# Patient Record
Sex: FEMALE | URBAN - METROPOLITAN AREA
[De-identification: names, ages, dates, MRNs, and addresses within clinical notes are randomized per-mention and may not be internally consistent; named-entity substitution may affect disease eponyms.]

---

## 2018-04-23 ENCOUNTER — OFFICE VISIT (OUTPATIENT)
Dept: OBGYN | Facility: CLINIC | Age: 38
End: 2018-04-23
Attending: OBSTETRICS & GYNECOLOGY
Payer: COMMERCIAL

## 2018-04-23 DIAGNOSIS — N92.0 MENORRHAGIA WITH REGULAR CYCLE: Primary | ICD-10-CM

## 2018-04-23 LAB
DEPRECATED CALCIDIOL+CALCIFEROL SERPL-MC: 24 UG/L (ref 20–75)
ERYTHROCYTE [DISTWIDTH] IN BLOOD BY AUTOMATED COUNT: 12.4 % (ref 10–15)
HCT VFR BLD AUTO: 42.1 % (ref 35–47)
HGB BLD-MCNC: 13.9 G/DL (ref 11.7–15.7)
MCH RBC QN AUTO: 32.4 PG (ref 26.5–33)
MCHC RBC AUTO-ENTMCNC: 33 G/DL (ref 31.5–36.5)
MCV RBC AUTO: 98 FL (ref 78–100)
PLATELET # BLD AUTO: 214 10E9/L (ref 150–450)
RBC # BLD AUTO: 4.29 10E12/L (ref 3.8–5.2)
TSH SERPL DL<=0.005 MIU/L-ACNC: 1.62 MU/L (ref 0.4–4)
WBC # BLD AUTO: 5.4 10E9/L (ref 4–11)

## 2018-04-23 PROCEDURE — 82306 VITAMIN D 25 HYDROXY: CPT | Performed by: OBSTETRICS & GYNECOLOGY

## 2018-04-23 PROCEDURE — 36415 COLL VENOUS BLD VENIPUNCTURE: CPT | Performed by: OBSTETRICS & GYNECOLOGY

## 2018-04-23 PROCEDURE — 85027 COMPLETE CBC AUTOMATED: CPT | Performed by: OBSTETRICS & GYNECOLOGY

## 2018-04-23 PROCEDURE — 84443 ASSAY THYROID STIM HORMONE: CPT | Performed by: OBSTETRICS & GYNECOLOGY

## 2018-04-23 PROCEDURE — G0463 HOSPITAL OUTPT CLINIC VISIT: HCPCS | Mod: ZF

## 2018-04-23 ASSESSMENT — ANXIETY QUESTIONNAIRES
2. NOT BEING ABLE TO STOP OR CONTROL WORRYING: NEARLY EVERY DAY
3. WORRYING TOO MUCH ABOUT DIFFERENT THINGS: NEARLY EVERY DAY
GAD7 TOTAL SCORE: 20
6. BECOMING EASILY ANNOYED OR IRRITABLE: NEARLY EVERY DAY
5. BEING SO RESTLESS THAT IT IS HARD TO SIT STILL: MORE THAN HALF THE DAYS
7. FEELING AFRAID AS IF SOMETHING AWFUL MIGHT HAPPEN: NEARLY EVERY DAY
1. FEELING NERVOUS, ANXIOUS, OR ON EDGE: NEARLY EVERY DAY

## 2018-04-23 ASSESSMENT — PATIENT HEALTH QUESTIONNAIRE - PHQ9: 5. POOR APPETITE OR OVEREATING: NEARLY EVERY DAY

## 2018-04-23 NOTE — LETTER
Date:April 25, 2018      Patient was self referred, no letter generated. Do not send.        [facility] Health Information

## 2018-04-23 NOTE — NURSING NOTE
Chief Complaint   Patient presents with     Establish Care     Discuss menstural cycle and PMDD. Has hx of depression and anxiety.       See GALLO David 4/23/2018

## 2018-04-23 NOTE — PROGRESS NOTES
CC/HPI:   Kaycee Martínez is a 37 year old  with c/o heavy periods and worsening mood/anxiety symptoms the 2 weeks before her period.  She was referred to me by her mother who is a patient of mine.  Her periods continue to be very regular but have been getting progressively heavier over the last year.  She uses condoms for contraception.  She thinks that she is done having children but hasn't completely ruled out one more so is not interested in permanent sterilization.  Her pap is current from an outside clinic and she has no history of abnormal paps.  She has never had a pelvic ultrasound or endometrial biopsy.        HISTORIES:  Patient Active Problem List   Diagnosis   (none) - all problems resolved or deleted     Past Medical History:   Diagnosis Date     Anxiety      Depression      Varicose vein of leg     on right leg     Past Surgical History:   Procedure Laterality Date     KNEE SURGERY Bilateral     ACL repair     No current outpatient prescriptions on file.     Allergies   Allergen Reactions     Liquid Adhesive Itching and Rash     Adhesive pad gels used for EKG monitors has caused rash and itching which spreads if scratched.  Adhesive pad gels used for EKG monitors has caused rash and itching which spreads if scratched.     Social History     Social History     Marital status:      Spouse name: N/A     Number of children: N/A     Years of education: N/A     Occupational History     Merchandising supervisor      Social History Main Topics     Smoking status: Never Smoker     Smokeless tobacco: Never Used     Alcohol use Yes      Comment: 1 drink twice a month     Drug use: No     Sexual activity: Yes     Partners: Male     Birth control/ protection: Condom     Other Topics Concern     Not on file     Social History Narrative     No narrative on file     Family History   Problem Relation Age of Onset     CEREBROVASCULAR DISEASE Father      Mini stroke     Breast Cancer Maternal Grandmother  60          Gyn Hx:   No LMP recorded.  Menses:   See HPI    Review Of Systems:  CONSTITUTIONAL: NEGATIVE for fever, chills  EYES: NEGATIVE for vision changes   RESP: NEGATIVE for significant cough or SOB  CV: NEGATIVE for chest pain, palpitations   GI: NEGATIVE for nausea, abdominal pain, heartburn, or change in bowel habits  : NEGATIVE for frequency, dysuria, or hematuria  MUSCULOSKELETAL: NEGATIVE for significant arthralgias or myalgia  NEURO: NEGATIVE for weakness, dizziness or paresthesias or headache    EXAM:  There were no vitals taken for this visit.  There is no height or weight on file to calculate BMI.    General - pleasant female in no acute distress.    ASSESSMENT    36 y/o  with heavy periods and increasing luteal phase mood symptoms.    Plan    Will get a pelvic u/s for further assessment of the endometrial stripe, TSH, CBC and vitamin D today.  Briefly discussed options for cycle control-OCPs, Mirena IUD and possibility of adding a luteal phase vs a daily SSRI for mood symptoms.  Discussed starting therapy here with Dr. Ovalle or Dr. Morrell as well.  She was given information about the Mirena IUD and cards for Lore Ovlale and Porsche.      Bre Forbes MD, FACOG

## 2018-04-23 NOTE — MR AVS SNAPSHOT
After Visit Summary   4/23/2018    Kaycee Martínez    MRN: [id number]           Patient Information     Date Of Birth          1980        Visit Information        Provider Department      4/23/2018 11:00 AM Bre Forbes MD [facility]        Today's Diagnoses     Menorrhagia with regular cycle    -  1       Follow-ups after your visit        Follow-up notes from your care team     Return in about 2 years (around 4/23/2020).      Your next 10 appointments already scheduled     May 07, 2018  1:30 PM CDT   ULTRASOUND with [facility] ULTRASOUND   [facility] ([facility])    [facility] [address]  [address]  [facility] [id number] [phone]            May 07, 2018  2:00 PM CDT   Return Visit with Bre Forbes MD   [facility] ([facility])    [facility] [address]  [address]  [address]  [facility] [id number] [phone]              Future tests that were ordered for you today     Open Future Orders        Priority Expected Expires Ordered    US GYN Complete Transvaginal - 27449 (In Clinic) Routine  8/21/2018 4/23/2018            Who to contact     Please call your clinic at [phone] to:    Ask questions about your health    Make or cancel appointments    Discuss your medicines    Learn about your test results    Speak to your doctor            Additional Information About Your Visit        MyChart Information     [employer] is an electronic gateway that provides easy, online access to your medical records. With [employer], you can request a clinic appointment, read your test results, renew a prescription or communicate with your care team.     To sign up for [employer] visit the website at [email or web address]   You will be asked to enter the access code listed below, as well as some personal information. Please follow the  directions to create your username and password.     Your access code is: [email or web address]  Expires: 2018  6:31 AM     Your access code will  in 90 days. If you need help or a new code, please contact your [facility] or call [phone] for assistance.        Care EveryWhere ID     This is your Care EveryWhere ID. This could be used by other organizations to access your [place] medical records  [id number]         Blood Pressure from Last 3 Encounters:   No data found for BP    Weight from Last 3 Encounters:   No data found for Wt              We Performed the Following     25- OH-Vitamin D     CBC with Platelets     TSH with free T4 reflex        Primary Care Provider    None Specified       No primary provider on file.        Equal Access to Services     FLORENCIA MCCLURE : Ana Paz, ibeth laguna, ish tolbert, blanche chamorro . So [facility] [phone].    ATENCIÓN: Si habla español, tiene a chang disposición servicios gratuitos de asistencia lingüística. Llame al [phone].    We comply with applicable federal civil rights laws and [place] laws. We do not discriminate on the basis of race, color, national origin, age, disability, sex, sexual orientation, or gender identity.            Thank you!     Thank you for choosing [facility]  for your care. Our goal is always to provide you with excellent care. Hearing back from our patients is one way we can continue to improve our services. Please take a few minutes to complete the written survey that you may receive in the mail after your visit with us. Thank you!             Your Updated Medication List - Protect others around you: Learn how to safely use, store and throw away your medicines at www.disposemymeds.org.      Notice  As of 2018 11:59 PM    You have not been prescribed any medications.

## 2018-04-23 NOTE — LETTER
4/24/2018         Kaycee Martínez   [address] [facility] [address]        Dear Ms. Martínez:    The results of your recent labs are all normal.      Results for orders placed or performed in visit on 04/23/18   TSH with free T4 reflex   Result Value Ref Range    TSH 1.62 0.40 - 4.00 mU/L   CBC with Platelets   Result Value Ref Range    WBC 5.4 4.0 - 11.0 10e9/L    RBC Count 4.29 3.8 - 5.2 10e12/L    Hemoglobin 13.9 11.7 - 15.7 g/dL    Hematocrit 42.1 35.0 - 47.0 %    MCV 98 78 - 100 fl    MCH 32.4 26.5 - 33.0 pg    MCHC 33.0 31.5 - 36.5 g/dL    RDW 12.4 10.0 - 15.0 %    Platelet Count 214 150 - 450 10e9/L   25- OH-Vitamin D   Result Value Ref Range    Vitamin D Deficiency screening 24 20 - 75 ug/L         Please note that test explanations are brief and do not reflect all diagnostic uses.  If you have any questions or concerns, please call the clinic at [phone].      Sincerely,      Bre Forbes MD

## 2018-04-23 NOTE — LETTER
4/23/2018       RE: Kaycee Martínez  [address]  [facility] [address]     Dear Colleague,    Thank you for referring your patient, Kaycee Martínez, to the [facility] at [facility]. Please see a copy of my visit note below.    CC/HPI:   Kaycee Martínez is a 37 year old  with c/o heavy periods and worsening mood/anxiety symptoms the 2 weeks before her period.  She was referred to me by her mother who is a patient of mine.  Her periods continue to be very regular but have been getting progressively heavier over the last year.  She uses condoms for contraception.  She thinks that she is done having children but hasn't completely ruled out one more so is not interested in permanent sterilization.  Her pap is current from an outside clinic and she has no history of abnormal paps.  She has never had a pelvic ultrasound or endometrial biopsy.        HISTORIES:  Patient Active Problem List   Diagnosis   (none) - all problems resolved or deleted     Past Medical History:   Diagnosis Date     Anxiety      Depression      Varicose vein of leg     on right leg     Past Surgical History:   Procedure Laterality Date     KNEE SURGERY Bilateral     ACL repair     No current outpatient prescriptions on file.     Allergies   Allergen Reactions     Liquid Adhesive Itching and Rash     Adhesive pad gels used for EKG monitors has caused rash and itching which spreads if scratched.  Adhesive pad gels used for EKG monitors has caused rash and itching which spreads if scratched.     Social History     Social History     Marital status:      Spouse name: N/A     Number of children: N/A     Years of education: N/A     Occupational History     Merchandising supervisor      Social History Main Topics     Smoking status: Never Smoker     Smokeless tobacco: Never Used     Alcohol use Yes      Comment: 1 drink twice a month     Drug use: No     Sexual activity:  Yes     Partners: Male     Birth control/ protection: Condom     Other Topics Concern     Not on file     Social History Narrative     No narrative on file     Family History   Problem Relation Age of Onset     CEREBROVASCULAR DISEASE Father      Mini stroke     Breast Cancer Maternal Grandmother 60          Gyn Hx:   No LMP recorded.  Menses:   See HPI    Review Of Systems:  CONSTITUTIONAL: NEGATIVE for fever, chills  EYES: NEGATIVE for vision changes   RESP: NEGATIVE for significant cough or SOB  CV: NEGATIVE for chest pain, palpitations   GI: NEGATIVE for nausea, abdominal pain, heartburn, or change in bowel habits  : NEGATIVE for frequency, dysuria, or hematuria  MUSCULOSKELETAL: NEGATIVE for significant arthralgias or myalgia  NEURO: NEGATIVE for weakness, dizziness or paresthesias or headache    EXAM:  There were no vitals taken for this visit.  There is no height or weight on file to calculate BMI.    General - pleasant female in no acute distress.    ASSESSMENT    38 y/o  with heavy periods and increasing luteal phase mood symptoms.    Plan    Will get a pelvic u/s for further assessment of the endometrial stripe, TSH, CBC and vitamin D today.  Briefly discussed options for cycle control-OCPs, Mirena IUD and possibility of adding a luteal phase vs a daily SSRI for mood symptoms.  Discussed starting therapy here with Dr. Ovalle or Dr. Morrell as well.  She was given information about the Mirena IUD and cards for Lore Ovalle and Porsche.      Bre Forbes MD, FACOG      Again, thank you for allowing me to participate in the care of your patient.      Sincerely,    Bre Forbes MD

## 2018-04-24 ASSESSMENT — PATIENT HEALTH QUESTIONNAIRE - PHQ9: SUM OF ALL RESPONSES TO PHQ QUESTIONS 1-9: 16

## 2018-04-24 ASSESSMENT — ANXIETY QUESTIONNAIRES: GAD7 TOTAL SCORE: 20

## 2018-04-30 ENCOUNTER — HEALTH MAINTENANCE LETTER (OUTPATIENT)
Age: 38
End: 2018-04-30

## 2018-05-07 ENCOUNTER — OFFICE VISIT (OUTPATIENT)
Dept: OBGYN | Facility: CLINIC | Age: 38
End: 2018-05-07
Attending: OBSTETRICS & GYNECOLOGY
Payer: COMMERCIAL

## 2018-05-07 VITALS
BODY MASS INDEX: 29.02 KG/M2 | HEART RATE: 62 BPM | DIASTOLIC BLOOD PRESSURE: 67 MMHG | HEIGHT: 64 IN | WEIGHT: 170 LBS | SYSTOLIC BLOOD PRESSURE: 105 MMHG

## 2018-05-07 DIAGNOSIS — N92.0 MENORRHAGIA WITH REGULAR CYCLE: ICD-10-CM

## 2018-05-07 DIAGNOSIS — N92.0 MENORRHAGIA WITH REGULAR CYCLE: Primary | ICD-10-CM

## 2018-05-07 DIAGNOSIS — F43.22 ADJUSTMENT DISORDER WITH ANXIOUS MOOD: ICD-10-CM

## 2018-05-07 PROCEDURE — G0463 HOSPITAL OUTPT CLINIC VISIT: HCPCS | Mod: ZF,25

## 2018-05-07 PROCEDURE — 76830 TRANSVAGINAL US NON-OB: CPT | Mod: ZF

## 2018-05-07 NOTE — LETTER
Date:May 9, 2018      Patient was self referred, no letter generated. Do not send.        [facility] Health Information

## 2018-05-07 NOTE — LETTER
Date:May 9, 2018      Patient was self referred, no letter generated. Do not send.        [facility] Health Information

## 2018-05-07 NOTE — LETTER
"5/7/2018       RE: Kaycee Martínez  [address]  [facility] [address]     Dear Colleague,    Thank you for referring your patient, Kaycee Martínez, to the [facility] at [facility]. Please see a copy of my visit note below.    S:  Pt is a 38 y/o  who returns today to follow up her ultrasound that was done earlier today and well as to go over options for her heavy periods and premenstrual symptoms.  Today she states that she would really like to start something for her mood and anxiety symptoms and thinks that she would like to have a Mirena IUD placed for cycle control.  She is near mid-cycle today.    O: /67 (BP Location: Left arm, Patient Position: Chair)  Pulse 62  Ht 1.626 m (5' 4\")  Wt 77.1 kg (170 lb)  LMP 04/16/2018  Breastfeeding? No  BMI 29.18 kg/m2    gen-pleasant, NAD    37 year old female with LMP approximately 2 - 3 weeks ago, presents for gynecologic ultrasound indicated by menorrhagia.  This study was done transvaginally.     Uterine findings:                        Presence: Visible Size: Normal 5.8x7.0x5.0 cm.  Endometrium = 11.4 mm.                        Cx length = 32.4 mm.                            Flexion:  Retroverted            Position: Midline                    Margins: Smooth                   Shape: Normal                        Contour: Regular                  Texture: Homogeneous                    Cavity: Normal                      Masses: Normal     Pelvic findings:                         Right Adnexa: Normal                        Left Adnexa: Normal                        Bladder:  Normal                                                                                                             Cul - de - sac fluid: None     Ovarian follicles:                        Right ovary:  4.2x2.6x2.3cm.                           Small follicles                           Size(s):  Less than 5mm         "                   Left ovary:  3.0x2.2x1.5cm.                           Small follicles                           Size(s):  Less than 5mm        Comments:  Normal appearing uterus and endometrial stripe.  Normal ovaries bilaterally.  Further studies as clinically indicated.     Evonne PearlMS Bre Forbes MD, FACOG    A:  38 y/o  with heavy periods and increased mood/anxiety symptoms.  Her pelvic ultrasound from today is normal.    P:  Discussed starting Zoloft for her mood, she would like to do this.  Will start with 50 mg daily, titrate start with 25 mg daily for 3 days.  She will call with the first day of her next period to schedule Mirena IUD insertion.  Reviewed the expected spotting after placement that can last up to 3 months.  Likely her next visit will be about 2 weeksfrom now-can follow up on her Zoloft then and increase the dose if needed.    Bre Forbes MD, FACOG        Again, thank you for allowing me to participate in the care of your patient.      Sincerely,    Bre Forbes MD

## 2018-05-07 NOTE — MR AVS SNAPSHOT
After Visit Summary   5/7/2018    Kaycee Martínez    MRN: [id number]           Patient Information     Date Of Birth          1980        Visit Information        Provider Department      5/7/2018 1:30 PM [facility] ULTRASOUND [facility]        Today's Diagnoses     Menorrhagia with regular cycle           Follow-ups after your visit        Your next 10 appointments already scheduled     May 23, 2018  2:00 PM CDT   Return Visit with Ghada Ovalle, PhD LUIS ANTONIO   [facility]  ([facility])    [facility]  [address]  [address]  [facility] [id number] [phone]            Jul 16, 2018  3:00 PM CDT   Return Visit with Ghada Ovalle, PhD LUIS ANTONIO   [facility]  ([facility])    [facility]  [address]  [address]  [facility] [id number] [phone]            Aug 20, 2018  2:00 PM CDT   Return Visit with Ghdaa Ovalle, PhD    [facility]  ([facility])    [facility]  [address]  [address]  [facility] [id number] [phone]              Who to contact     Please call your clinic at [phone] to:    Ask questions about your health    Make or cancel appointments    Discuss your medicines    Learn about your test results    Speak to your doctor            Additional Information About Your Visit        [employer] Information     [employer] gives you secure access to your electronic health record. If you see a primary care provider, you can also send messages to your care team and make appointments. If you have questions, please call your primary care clinic.  If you do not have a primary care provider, please call [phone] and they will assist you.      [employer] is an electronic gateway that provides easy, online access to your medical records. With [employer], you can request a clinic  appointment, read your test results, renew a prescription or communicate with your care team.     To access your existing account, please contact your [facility] or call [phone] for assistance.        Care EveryWhere ID     This is your Care EveryWhere ID. This could be used by other organizations to access your [place] medical records  [id number]        Your Vitals Were     Last Period                   04/16/2018            Blood Pressure from Last 3 Encounters:   05/07/18 105/67    Weight from Last 3 Encounters:   05/07/18 77.1 kg (170 lb)              We Performed the Following     US GYN Complete Transvaginal - 95029 (In Clinic)          Today's Medication Changes          These changes are accurate as of 5/7/18 11:59 PM.  If you have any questions, ask your nurse or doctor.               Start taking these medicines.        Dose/Directions    sertraline 50 MG tablet   Commonly known as:  ZOLOFT   Used for:  Adjustment disorder with anxious mood   Started by:  Bre Forbes MD        Take 1/2 tablet (25 mg) for 3 days, then increase to 1 tablet orally daily   Quantity:  30 tablet   Refills:  0            Where to get your medicines      These medications were sent to [facility] [address]  [address] [place], [facility] [address]     Phone:  [phone]     sertraline 50 MG tablet                Primary Care Provider    None Specified       No primary provider on file.        Equal Access to Services     FLORENCIA MCCLURE : Ana Paz, watisha laguna, qaybta kaalmablanche martino. So [facility] [phone].    ATENCIÓN: Si habla español, tiene a chang disposición servicios gratuitos de asistencia lingüística. Branden al [phone].    We comply with applicable federal civil rights laws and [place] laws. We do not discriminate on the basis of race, color,  national origin, age, disability, sex, sexual orientation, or gender identity.            Thank you!     Thank you for choosing [facility]  for your care. Our goal is always to provide you with excellent care. Hearing back from our patients is one way we can continue to improve our services. Please take a few minutes to complete the written survey that you may receive in the mail after your visit with us. Thank you!             Your Updated Medication List - Protect others around you: Learn how to safely use, store and throw away your medicines at www.disposemymeds.org.          This list is accurate as of 5/7/18 11:59 PM.  Always use your most recent med list.                   Brand Name Dispense Instructions for use Diagnosis    sertraline 50 MG tablet    ZOLOFT    30 tablet    Take 1/2 tablet (25 mg) for 3 days, then increase to 1 tablet orally daily    Adjustment disorder with anxious mood

## 2018-05-07 NOTE — LETTER
5/7/2018       RE: Kaycee Martínez  [address]  [place] [facility] [address]     Dear Colleague,    Thank you for referring your patient, Kaycee Martínez, to the [facility] at [facility]. Please see a copy of my visit note below.    37 year old female with LMP approximately 2 - 3 weeks ago, presents for gynecologic ultrasound indicated by menorrhagia.  This study was done transvaginally.    Uterine findings:   Presence: Visible Size: Normal 5.8x7.0x5.0 cm.  Endometrium = 11.4 mm.   Cx length = 32.4 mm.      Flexion:  Retroverted Position: Midline Margins: Smooth Shape: Normal   Contour: Regular Texture: Homogeneous Cavity: Normal Masses: Normal    Pelvic findings:    Right Adnexa: Normal   Left Adnexa: Normal   Bladder:  Normal         Cul - de - sac fluid: None    Ovarian follicles:   Right ovary:  4.2x2.6x2.3cm.     Small follicles     Size(s):  Less than 5mm     Left ovary:  3.0x2.2x1.5cm.     Small follicles     Size(s):  Less than 5mm      Comments:  Normal appearing uterus and endometrial stripe.  Normal ovaries bilaterally.  Further studies as clinically indicated.    ALEXANDRA Bentley MD, FACOG        Again, thank you for allowing me to participate in the care of your patient.      Sincerely,    [facility]

## 2018-05-07 NOTE — PROGRESS NOTES
37 year old female with LMP approximately 2 - 3 weeks ago, presents for gynecologic ultrasound indicated by menorrhagia.  This study was done transvaginally.    Uterine findings:   Presence: Visible Size: Normal 5.8x7.0x5.0 cm.  Endometrium = 11.4 mm.   Cx length = 32.4 mm.      Flexion:  Retroverted Position: Midline Margins: Smooth Shape: Normal   Contour: Regular Texture: Homogeneous Cavity: Normal Masses: Normal    Pelvic findings:    Right Adnexa: Normal   Left Adnexa: Normal   Bladder:  Normal         Cul - de - sac fluid: None    Ovarian follicles:   Right ovary:  4.2x2.6x2.3cm.     Small follicles     Size(s):  Less than 5mm     Left ovary:  3.0x2.2x1.5cm.     Small follicles     Size(s):  Less than 5mm      Comments:  Normal appearing uterus and endometrial stripe.  Normal ovaries bilaterally.  Further studies as clinically indicated.    ALEXANDRA Bentley MD, FACOG

## 2018-05-07 NOTE — MR AVS SNAPSHOT
After Visit Summary   5/7/2018    Kaycee Martínez    MRN: [id number]           Patient Information     Date Of Birth          1980        Visit Information        Provider Department      5/7/2018 2:00 PM Bre Forbes MD [facility]        Today's Diagnoses     Menorrhagia with regular cycle    -  1    Adjustment disorder with anxious mood           Follow-ups after your visit        Your next 10 appointments already scheduled     May 23, 2018  2:00 PM CDT   Return Visit with Ghada Ovalle, PhD    [facility]  ([facility])    [facility] [employer]  [address]  [address]  [facility] [address] [phone]            Jul 16, 2018  3:00 PM CDT   Return Visit with Ghada Ovalle, PhD    [facility]  ([facility])    [place] [employer]  [address], [address]  [address]  [facility] [id number] [phone]            Aug 20, 2018  2:00 PM CDT   Return Visit with Ghada Ovalle, PhD    [facility]  ([facility])    [facility] [employer]  [address], [address]  [address]  [facility] [id number] [phone]              Who to contact     Please call your clinic at [phone] to:    Ask questions about your health    Make or cancel appointments    Discuss your medicines    Learn about your test results    Speak to your doctor            Additional Information About Your Visit        [employer] Information     [employer] gives you secure access to your electronic health record. If you see a primary care provider, you can also send messages to your care team and make appointments. If you have questions, please call your primary care clinic.  If you do not have a primary care provider, please call [phone] and they will assist you.      [employer] is an electronic gateway that provides easy, online access to your  "medical records. With [employer], you can request a clinic appointment, read your test results, renew a prescription or communicate with your care team.     To access your existing account, please contact your [facility] or call [phone] for assistance.        Care EveryWhere ID     This is your Care EveryWhere ID. This could be used by other organizations to access your [facility] medical records  [id number]        Your Vitals Were     Pulse Height Last Period Breastfeeding? BMI (Body Mass Index)       62 1.626 m (5' 4\") 04/16/2018 No 29.18 kg/m2        Blood Pressure from Last 3 Encounters:   05/07/18 105/67    Weight from Last 3 Encounters:   05/07/18 77.1 kg (170 lb)              Today, you had the following     No orders found for display         Today's Medication Changes          These changes are accurate as of 5/7/18 11:59 PM.  If you have any questions, ask your nurse or doctor.               Start taking these medicines.        Dose/Directions    sertraline 50 MG tablet   Commonly known as:  ZOLOFT   Used for:  Adjustment disorder with anxious mood   Started by:  Bre Forbes MD        Take 1/2 tablet (25 mg) for 3 days, then increase to 1 tablet orally daily   Quantity:  30 tablet   Refills:  0            Where to get your medicines      These medications were sent to [facility] [address].  [address] [facility] [address]     Phone:  [phone]     sertraline 50 MG tablet                Primary Care Provider    None Specified       No primary provider on file.        Equal Access to Services     FLORENCIA MCCLURE : Hadii ziara thorntono Soedi, waaxda luqadaha, qaybta kaalmada adeegyada, blanche chamorro . So [facility] [phone].    ATENCIÓN: Si habla español, tiene a chang disposición servicios gratuitos de asistencia lingüística. Llame al [phone].    We comply with " applicable federal civil rights laws and [place] laws. We do not discriminate on the basis of race, color, national origin, age, disability, sex, sexual orientation, or gender identity.            Thank you!     Thank you for choosing [facility]  for your care. Our goal is always to provide you with excellent care. Hearing back from our patients is one way we can continue to improve our services. Please take a few minutes to complete the written survey that you may receive in the mail after your visit with us. Thank you!             Your Updated Medication List - Protect others around you: Learn how to safely use, store and throw away your medicines at www.disposemymeds.org.          This list is accurate as of 5/7/18 11:59 PM.  Always use your most recent med list.                   Brand Name Dispense Instructions for use Diagnosis    sertraline 50 MG tablet    ZOLOFT    30 tablet    Take 1/2 tablet (25 mg) for 3 days, then increase to 1 tablet orally daily    Adjustment disorder with anxious mood

## 2018-05-08 NOTE — PROGRESS NOTES
"S:  Pt is a 38 y/o  who returns today to follow up her ultrasound that was done earlier today and well as to go over options for her heavy periods and premenstrual symptoms.  Today she states that she would really like to start something for her mood and anxiety symptoms and thinks that she would like to have a Mirena IUD placed for cycle control.  She is near mid-cycle today.    O: /67 (BP Location: Left arm, Patient Position: Chair)  Pulse 62  Ht 1.626 m (5' 4\")  Wt 77.1 kg (170 lb)  LMP 04/16/2018  Breastfeeding? No  BMI 29.18 kg/m2    gen-pleasant, NAD    37 year old female with LMP approximately 2 - 3 weeks ago, presents for gynecologic ultrasound indicated by menorrhagia.  This study was done transvaginally.     Uterine findings:                        Presence: Visible Size: Normal 5.8x7.0x5.0 cm.  Endometrium = 11.4 mm.                        Cx length = 32.4 mm.                            Flexion:  Retroverted            Position: Midline                    Margins: Smooth                   Shape: Normal                        Contour: Regular                  Texture: Homogeneous                    Cavity: Normal                      Masses: Normal     Pelvic findings:                         Right Adnexa: Normal                        Left Adnexa: Normal                        Bladder:  Normal                                                                                                             Cul - de - sac fluid: None     Ovarian follicles:                        Right ovary:  4.2x2.6x2.3cm.                           Small follicles                           Size(s):  Less than 5mm                           Left ovary:  3.0x2.2x1.5cm.                           Small follicles                           Size(s):  Less than 5mm        Comments:  Normal appearing uterus and endometrial stripe.  Normal ovaries bilaterally.  Further studies as clinically indicated.     Evonne ZAVALA" [facility]  Bre Forbes MD, FACOG    A:  36 y/o  with heavy periods and increased mood/anxiety symptoms.  Her pelvic ultrasound from today is normal.    P:  Discussed starting Zoloft for her mood, she would like to do this.  Will start with 50 mg daily, titrate start with 25 mg daily for 3 days.  She will call with the first day of her next period to schedule Mirena IUD insertion.  Reviewed the expected spotting after placement that can last up to 3 months.  Likely her next visit will be about 2 weeksfrom now-can follow up on her Zoloft then and increase the dose if needed.    Bre Forbes MD, FACOG

## 2018-06-01 ENCOUNTER — TELEPHONE (OUTPATIENT)
Dept: OBGYN | Facility: CLINIC | Age: 38
End: 2018-06-01

## 2018-06-01 DIAGNOSIS — F43.22 ADJUSTMENT DISORDER WITH ANXIOUS MOOD: ICD-10-CM

## 2018-06-01 NOTE — TELEPHONE ENCOUNTER
Tried to reach Kaycee but received voicemail.  Left message that refill request was received and a one month supply can be sent to pharmacy but office visit is due for further refills. Dr. Forbes started patient on this med in May and intended to f/u on med when patient came in for IUD insertion which has not yet happened.     Advised pt to call [phone] to schedule.

## 2018-07-16 ENCOUNTER — TELEPHONE (OUTPATIENT)
Dept: OBGYN | Facility: CLINIC | Age: 38
End: 2018-07-16

## 2018-07-16 ENCOUNTER — OFFICE VISIT (OUTPATIENT)
Dept: PSYCHOLOGY | Facility: CLINIC | Age: 38
End: 2018-07-16
Payer: COMMERCIAL

## 2018-07-16 DIAGNOSIS — F43.22 ADJUSTMENT DISORDER WITH ANXIOUS MOOD: ICD-10-CM

## 2018-07-16 DIAGNOSIS — F41.1 GAD (GENERALIZED ANXIETY DISORDER): Primary | ICD-10-CM

## 2018-07-16 NOTE — TELEPHONE ENCOUNTER
Received refill request for sertraline. Has clinic appointment at end of July. 30 day supply sent.

## 2018-07-16 NOTE — TELEPHONE ENCOUNTER
Left a message with patient-   That Dr. Forbes would like her to see a primary care MD so she get treatment sooner than later.  Recommended Dr. Lemus as primary if she doesn't have another provider.

## 2018-07-16 NOTE — TELEPHONE ENCOUNTER
----- Message from Marzena Sales sent at 7/16/2018  2:56 PM CDT -----  Regarding: gallbladder issues/pain  Has had galbladder issues in the past.  Andrei patient.  Wants to maybe speak to a nurse.  'pain' resurfacing.  Offerered appointment with Dr. Lemus, but pt wanted nurses to consult with Dr. Forbes first.

## 2018-07-16 NOTE — MR AVS SNAPSHOT
After Visit Summary   7/16/2018    Kaycee Martínez    MRN: [id number]           Patient Information     Date Of Birth          1980        Visit Information        Provider Department      7/16/2018 3:00 PM Ghada Ovalle, PhD  [facility]         Today's Diagnoses     YARITZA (generalized anxiety disorder)    -  1       Follow-ups after your visit        Your next 10 appointments already scheduled     Jul 24, 2018  4:15 PM CDT   Return Visit with Bre Forbes MD   [facility] ([facility])    [place] [facility]  [address]  [address]  [facility] [address] [phone]            Aug 20, 2018  2:00 PM CDT   Return Visit with Ghada Ovalle, PhD LUIS ANTONIO   [facility]  ([facility])    [place] [facility]  [address]rd [address], [address]  [address]  [facility] [address] [phone]              Who to contact     Please call your clinic at [phone] to:    Ask questions about your health    Make or cancel appointments    Discuss your medicines    Learn about your test results    Speak to your doctor            Additional Information About Your Visit        [email or web address] Information     [employer] gives you secure access to your electronic health record. If you see a primary care provider, you can also send messages to your care team and make appointments. If you have questions, please call your primary care clinic.  If you do not have a primary care provider, please call [phone] and they will assist you.      [employer] is an electronic gateway that provides easy, online access to your medical records. With [employer], you can request a clinic appointment, read your test results, renew a prescription or communicate with your care team.     To access your existing account, please contact your [facility] or call [phone] for assistance.         Care EveryWhere ID     This is your Care EveryWhere ID. This could be used by other organizations to access your [facility] medical records  [id number]         Blood Pressure from Last 3 Encounters:   05/07/18 105/67    Weight from Last 3 Encounters:   05/07/18 77.1 kg (170 lb)              We Performed the Following     New Patient Visit - Psychiatric diagnostic interview examination (24545)          Where to get your medicines      These medications were sent to [facility] - [place], [place] - [address]  [address] [facility] [address]     Phone:  [phone]     sertraline 50 MG tablet          Primary Care Provider    None Specified       No primary provider on file.        Equal Access to Services     FLORENCIA MCCLURE : Ana Paz, ibeth laguna, ish tolbert, blanche presley. So [facility] [phone].    ATENCIÓN: Si habla español, tiene a chang disposición servicios gratuitos de asistencia lingüística. Llame al [phone].    We comply with applicable federal civil rights laws and [place] laws. We do not discriminate on the basis of race, color, national origin, age, disability, sex, sexual orientation, or gender identity.            Thank you!     Thank you for choosing [facility]   for your care. Our goal is always to provide you with excellent care. Hearing back from our patients is one way we can continue to improve our services. Please take a few minutes to complete the written survey that you may receive in the mail after your visit with us. Thank you!             Your Updated Medication List - Protect others around you: Learn how to safely use, store and throw away your medicines at www.disposemymeds.org.          This list is accurate as of 7/16/18 11:59 PM.  Always use your most recent med list.                   Brand Name Dispense Instructions for use Diagnosis    sertraline 50  MG tablet    ZOLOFT    30 tablet    Take 1 tablet orally daily    Adjustment disorder with anxious mood

## 2018-07-23 NOTE — PROGRESS NOTES
"    Name:  Kaycee Martínez  Mrn: [id number]  Date of visit: 7/16/2018    OUTPATIENT PSYCHOLOGICAL ASSESSMENT VISIT      REFERRAL SOURCE:  Bre Forbes MD, [facility]      Limits of confidentiality    Risks and benefits of treatment    The goals and procedures of the visit    IDENTIFYING INFORMATION: Kaycee Martínez is a 37-yo  woman reporting reporting difficulties managing  anxiety and OCD.       History of Present Illness:  Ms Martínez reported that anxiety has been chronic since 2016 and at this time she took a medication (unknown) for anxiety but found this was not helpful and increased menstrual bleeding.  In May 2018 (see EMR) she was started on sertraline and noted medication helped her feel  more mellow,  however she has been out of refills for a week and noted anxiety symptoms increasing again.  She is presenting to this visit upon the referral of her gynecologist and is interested in refilling her sertraline prescription.      Social History:  Born in [place] and raised in [place] with 4 siblings.  Her parents remain  and living in [place].  She  in 2006 and the couple has an 2yo boy, 7yo girl.  They live in their own house in [place].  Her  is an  alcoholic,  sober for 2 yrs.  Reported today that relationship with  is \"going really well.\"     Ms. Martínez earned an AA in Sign Language and BA in Communications.  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  She has been working as an Occupational  for special needs population and very much enjoys this work.       Abuse and Trauma History:  Denied     Family Mental Health History:  Sister - anxiety.  Maternal uncle - chemical dependency.  Paternal Aunt - alcohol dependency.  Mom may have some anxiety.    alcoholic  and sober for 2 yrs.      Mental Health History:  Marital therapy (approximately " 2006) focused on 's alcohol dependency.  In 2016, tried on med (unknown) for anxiety, 3mos and d/c due to lack of benefit and increase in menstrual bleeding.  Taking sertraline approximately 2months, discontinued for 1 week due to lack of refills.  Noted sertraline beneficial with regard to anxiety and symptoms around menses.      Current Psychotropic Medications: None, planned refill for sertraline.       Substance Use History:    Alcohol - maximum 1 serving per month   Tobacco - cigarettes, 2486-9049.     Caffeine - 1 c coffee and 1c tea/day  Drugs - Denied current or past use    Medical History:  ACL repair, both needs.   heavy periods .  Episodic gallstones.  (see EMR)    Past Medical History:   Diagnosis Date     Anxiety      Depression      Varicose vein of leg     on right leg       Psychological Symptoms:  Depressed mood initiating end of 2017 and ending beginning of June (predominant around menses).  Denied anhedonia.  Increasing weight, 15lbs, and attributed to prior job stress.  Denied difficulties with sleep.   Some guilt around history.  Denied problems with energy.  Self esteem improving with new job.  Anxiety around whether doors locked,  kids breathing,  remembering tasks (list making).  E.g.  scared to drive  in winter; asking kids to check things for her.  Repetitive moving furniture and  organizing.   Anxiety associated with restlessness,  have to keep moving,  irritability, muscle tension, occasional insomnia.  Worrying over 50% of days.  (Noted anxiety increasing off sertraline this past week.)    Denied current or past ANGELLA behaviors.    Denied suicidal or homicidal ideation intent or plan.        Mental Status: Presented casually dressed and groomed. Speech was of normal tone rate and volume.  Affect euthymic.  Eye contact was within normal limits. Formal cognitive assessment was not conducted.  Thoughts were logical and connected.  Ms. Martínez was oriented to time, person, place and  situation. Recent and remote memory appeared intact. No concentration difficulties evident or reported.  Fund of knowledge not assessed but appeared consistent with educational achievement. Judgment appeared good.   Strengths: job, education, housing.    Multiaxial Diagnoses:   Axis I:  Generalized Anxiety disorder (r/o OCD); (r/o depression)  Axis II: Deferred   Axis III: episodic gallstones; anxiety increasing and cramps with menses  Axis IV:  new job (1month)  Axis V: GAF = 60-65 (current)    Impressions & Plan:  Kaycee Martínez is a 37 year-old,  woman presenting with symptoms of anxiety with generalized and obsessive components.  She reported difficulties with depression (late 2017 - June 2018) and attributed these to a stressful job and her menses.  Symptoms of depression and anxiety and uncomfortable periods improved on sertraline (2months), however, her refills lapsed and she has been out of med for 1wk noting increase in anxiety.  History notable for anxiety in sister, chemical dependency on maternal and paternal sides, alcohol dependency in  (patient awareness of this beginning of marriage, 2006, reporting  2 years sober);  stressful job  with job change in past month.      Ms. Martínez is attributing history of mental health symptoms to external circumstances (job,  s drinking) and physiological changes (e.g. menses).  She engaged in marital therapy around her  s drinking but has never engaged in individual therapy.  Today she is reporting benefit from sertraline.  She is presenting to this visit in the context of requesting a refill from her prescribing provider.  Discussed symptom reduction with sertraline and her lack of goals for therapy (i.e. Satisfied with job change and current life circumstances, relationships, etc).  Education provided around monitoring symptoms and returning for therapy in the event benefits from sertraline do not persist (following re-initiation  of medications).  Patient is in agreement with plan and expressed understanding.      Total time spent = 55 minutes psychological assessment

## 2018-07-24 ENCOUNTER — OFFICE VISIT (OUTPATIENT)
Dept: OBGYN | Facility: CLINIC | Age: 38
End: 2018-07-24
Attending: OBSTETRICS & GYNECOLOGY
Payer: COMMERCIAL

## 2018-07-24 VITALS
SYSTOLIC BLOOD PRESSURE: 108 MMHG | DIASTOLIC BLOOD PRESSURE: 70 MMHG | HEIGHT: 64 IN | BODY MASS INDEX: 29.74 KG/M2 | WEIGHT: 174.2 LBS | HEART RATE: 59 BPM

## 2018-07-24 DIAGNOSIS — F51.02 ADJUSTMENT INSOMNIA: ICD-10-CM

## 2018-07-24 DIAGNOSIS — F43.22 ADJUSTMENT DISORDER WITH ANXIOUS MOOD: Primary | ICD-10-CM

## 2018-07-24 PROCEDURE — G0463 HOSPITAL OUTPT CLINIC VISIT: HCPCS | Mod: ZF

## 2018-07-24 RX ORDER — SERTRALINE HYDROCHLORIDE 100 MG/1
100 TABLET, FILM COATED ORAL DAILY
Qty: 90 TABLET | Refills: 1 | Status: SHIPPED | OUTPATIENT
Start: 2018-07-24

## 2018-07-24 RX ORDER — CYCLOBENZAPRINE HCL 5 MG
5 TABLET ORAL 3 TIMES DAILY PRN
Qty: 42 TABLET | Refills: 1 | Status: SHIPPED | OUTPATIENT
Start: 2018-07-24 | End: 2019-01-31

## 2018-07-24 ASSESSMENT — ANXIETY QUESTIONNAIRES
5. BEING SO RESTLESS THAT IT IS HARD TO SIT STILL: MORE THAN HALF THE DAYS
3. WORRYING TOO MUCH ABOUT DIFFERENT THINGS: SEVERAL DAYS
6. BECOMING EASILY ANNOYED OR IRRITABLE: MORE THAN HALF THE DAYS
GAD7 TOTAL SCORE: 10
2. NOT BEING ABLE TO STOP OR CONTROL WORRYING: SEVERAL DAYS
7. FEELING AFRAID AS IF SOMETHING AWFUL MIGHT HAPPEN: MORE THAN HALF THE DAYS
1. FEELING NERVOUS, ANXIOUS, OR ON EDGE: SEVERAL DAYS

## 2018-07-24 ASSESSMENT — PATIENT HEALTH QUESTIONNAIRE - PHQ9: 5. POOR APPETITE OR OVEREATING: SEVERAL DAYS

## 2018-07-24 NOTE — NURSING NOTE
Chief Complaint   Patient presents with     Follow Up For     Medication follow up: Alysa David CMA 7/24/2018

## 2018-07-24 NOTE — LETTER
Date:August 1, 2018      Patient was self referred, no letter generated. Do not send.        [facility] Health Information

## 2018-07-24 NOTE — MR AVS SNAPSHOT
After Visit Summary   7/24/2018    Kaycee Martínez    MRN: [id number]           Patient Information     Date Of Birth          1980        Visit Information        Provider Department      7/24/2018 4:15 PM Bre Forbes MD [facility]        Today's Diagnoses     Adjustment disorder with anxious mood    -  1    Adjustment insomnia           Follow-ups after your visit        Follow-up notes from your care team     Return in about 4 weeks (around 8/21/2018).      Your next 10 appointments already scheduled     Aug 20, 2018  2:00 PM CDT   Return Visit with Ghada Ovalle, PhD    [facility]  ([facility])    Critical access hospital  [facility] [address]  [address] [place] [id number] [phone]              Who to contact     Please call your clinic at [phone] to:    Ask questions about your health    Make or cancel appointments    Discuss your medicines    Learn about your test results    Speak to your doctor            Additional Information About Your Visit        MyChart Information     [employer] gives you secure access to your electronic health record. If you see a primary care provider, you can also send messages to your care team and make appointments. If you have questions, please call your primary care clinic.  If you do not have a primary care provider, please call [phone] and they will assist you.      [employer] is an electronic gateway that provides easy, online access to your medical records. With [employer], you can request a clinic appointment, read your test results, renew a prescription or communicate with your care team.     To access your existing account, please contact your [facility] or call [phone] for assistance.        Care EveryWhere ID     This is your Care EveryWhere ID. This could be used by other organizations to access your [facility]  "records  [id number]        Your Vitals Were     Pulse Height BMI (Body Mass Index)             59 1.626 m (5' 4\") 29.9 kg/m2          Blood Pressure from Last 3 Encounters:   07/24/18 108/70   05/07/18 105/67    Weight from Last 3 Encounters:   07/24/18 79 kg (174 lb 3.2 oz)   05/07/18 77.1 kg (170 lb)              Today, you had the following     No orders found for display         Today's Medication Changes          These changes are accurate as of 7/24/18 11:59 PM.  If you have any questions, ask your nurse or doctor.               Start taking these medicines.        Dose/Directions    cyclobenzaprine 5 MG tablet   Commonly known as:  FLEXERIL   Used for:  Adjustment insomnia   Started by:  Bre Forbes MD        Dose:  5 mg   Take 1 tablet (5 mg) by mouth 3 times daily as needed for muscle spasms   Quantity:  42 tablet   Refills:  1         These medicines have changed or have updated prescriptions.        Dose/Directions    * sertraline 50 MG tablet   Commonly known as:  ZOLOFT   This may have changed:  Another medication with the same name was added. Make sure you understand how and when to take each.   Used for:  Adjustment disorder with anxious mood   Changed by:  Bre Forbes MD        Take 1 tablet orally daily   Quantity:  30 tablet   Refills:  0       * sertraline 100 MG tablet   Commonly known as:  ZOLOFT   This may have changed:  You were already taking a medication with the same name, and this prescription was added. Make sure you understand how and when to take each.   Used for:  Adjustment disorder with anxious mood   Changed by:  Bre Forbes MD        Dose:  100 mg   Take 1 tablet (100 mg) by mouth daily   Quantity:  90 tablet   Refills:  1       * Notice:  This list has 2 medication(s) that are the same as other medications prescribed for you. Read the directions carefully, and ask your doctor or other care provider to review them with you.       "   Where to get your medicines      These [place] [facility] [place] [address]  [address] [facility] [address]     Phone:  [phone]     cyclobenzaprine 5 MG tablet    sertraline 100 MG tablet                Primary Care Provider    None Specified       No primary provider on file.        Equal Access to Services     [facility] : Hadii aad ku hadasho Soomaali, waaxda luqadaha, qaybta kaalmada adeegyada, blanche parker hayalbino hinojosarevakwasi chamorro . So [facility] [phone].    ATENCIÓN: Si habla español, tiene a chang disposición servicios gratuitos de asistencia lingüística. Branden al [phone].    We comply with applicable federal civil rights laws and [place] laws. We do not discriminate on the basis of race, color, national origin, age, disability, sex, sexual orientation, or gender identity.            Thank you!     Thank you for choosing [facility]  for your care. Our goal is always to provide you with excellent care. Hearing back from our patients is one way we can continue to improve our services. Please take a few minutes to complete the written survey that you may receive in the mail after your visit with us. Thank you!             Your Updated Medication List - Protect others around you: Learn how to safely use, store and throw away your medicines at www.disposemymeds.org.          This list is accurate as of 7/24/18 11:59 PM.  Always use your most recent med list.                   Brand Name Dispense Instructions for use Diagnosis    cyclobenzaprine 5 MG tablet    FLEXERIL    42 tablet    Take 1 tablet (5 mg) by mouth 3 times daily as needed for muscle spasms    Adjustment insomnia       * sertraline 50 MG tablet    ZOLOFT    30 tablet    Take 1 tablet orally daily    Adjustment disorder with anxious mood       * sertraline 100 MG tablet    ZOLOFT    90 tablet    Take 1 tablet (100 mg) by mouth daily     Adjustment disorder with anxious mood       * Notice:  This list has 2 medication(s) that are the same as other medications prescribed for you. Read the directions carefully, and ask your doctor or other care provider to review them with you.

## 2018-07-25 ASSESSMENT — ANXIETY QUESTIONNAIRES: GAD7 TOTAL SCORE: 10

## 2018-07-25 ASSESSMENT — PATIENT HEALTH QUESTIONNAIRE - PHQ9: SUM OF ALL RESPONSES TO PHQ QUESTIONS 1-9: 7

## 2018-07-31 NOTE — PROGRESS NOTES
"S:  Pt is a 36 y/o  who returns today to follow up her mood.  At her last visit she was started on Zoloft 50 mg for depression and anxiety symptoms.  She recently saw Ghada Riazin as well, but declines continuing therapy at this time.  She states that her symptoms have improved quite a bit-especially the frequent checking of things like locks, but is still having some difficulty especially with sleep.    O: /70 (BP Location: Right arm, Patient Position: Chair)  Pulse 59  Ht 1.626 m (5' 4\")  Wt 79 kg (174 lb 3.2 oz)  BMI 29.9 kg/m2      PHQ9-7  YARITZA-7 = 10    A:  36 y/o  with mainly anxiety symptoms and some improvement on Zoloft 50 mg.    P:  Discussed increasing her dose to 100mg daily.  She was open to this.  New Rx sent.  Discussed that there are several medications available that can help with sleep.  Will start with a trial of Flexeril 5 mg at HS for 2-3 weeks until the new dose of Zoloft is effective.   Encouraged her to return in 2-4 weeks for a follow up mood check.     Bre Forbes MD, FACOG    "

## 2019-01-31 ENCOUNTER — OFFICE VISIT (OUTPATIENT)
Dept: OBGYN | Facility: CLINIC | Age: 39
End: 2019-01-31
Attending: OBSTETRICS & GYNECOLOGY
Payer: COMMERCIAL

## 2019-01-31 VITALS
DIASTOLIC BLOOD PRESSURE: 77 MMHG | SYSTOLIC BLOOD PRESSURE: 113 MMHG | WEIGHT: 180.7 LBS | HEART RATE: 74 BPM | BODY MASS INDEX: 31.02 KG/M2

## 2019-01-31 DIAGNOSIS — Z30.9 CONTRACEPTIVE MANAGEMENT: Primary | ICD-10-CM

## 2019-01-31 DIAGNOSIS — N92.0 MENORRHAGIA WITH REGULAR CYCLE: Primary | ICD-10-CM

## 2019-01-31 DIAGNOSIS — F43.22 ADJUSTMENT DISORDER WITH ANXIOUS MOOD: ICD-10-CM

## 2019-01-31 DIAGNOSIS — F51.02 ADJUSTMENT INSOMNIA: ICD-10-CM

## 2019-01-31 RX ORDER — ETONOGESTREL AND ETHINYL ESTRADIOL VAGINAL RING .015; .12 MG/D; MG/D
1 RING VAGINAL
Qty: 3 EACH | Refills: 3 | Status: SHIPPED | OUTPATIENT
Start: 2019-01-31 | End: 2020-01-31

## 2019-01-31 RX ORDER — SERTRALINE HYDROCHLORIDE 100 MG/1
150 TABLET, FILM COATED ORAL DAILY
Qty: 135 TABLET | Refills: 3 | Status: SHIPPED | OUTPATIENT
Start: 2019-01-31 | End: 2020-01-31

## 2019-01-31 RX ORDER — CYCLOBENZAPRINE HCL 5 MG
5 TABLET ORAL 3 TIMES DAILY PRN
Qty: 42 TABLET | Refills: 1 | Status: SHIPPED | OUTPATIENT
Start: 2019-01-31

## 2019-01-31 ASSESSMENT — PAIN SCALES - GENERAL: PAINLEVEL: NO PAIN (0)

## 2019-01-31 NOTE — PROGRESS NOTES
S:  Pt is a 39 y/o  who presents today to follow up her history of heavy periods as well as anxiety symptoms.  In 5/18 she was seen for similar symptoms and at that time she was started on Zoloft and we discussed a Mirena IUD for cycle control.  She did not return for the Mirena.  Her Zoloft was ultimately increased to 100 mg with good control of her symptoms.  Today she notes continued heavy bleeding and that her anxiety symptoms are worse lately.  She has a normal hgb and TSH less than a year ago.      O:  /77 (BP Location: Left arm, Patient Position: Sitting, Cuff Size: Adult Large)   Pulse 74   Wt 82 kg (180 lb 11.2 oz)   LMP 01/26/2019 (Exact Date)   Breastfeeding? No   BMI 31.02 kg/m      gen-pleasant, NAD    A:  39 y/o  with ongoing heavy periods and anxiety symptoms.    P:  Offered trial of 150 mg Zoloft daily-she would like to try.  Discussed that she will need at least 2 weeks to notice a change in her symptoms.  Encouraged her to establish care with a therapist here or a clinic of her choice.  Pelvic ultrasound was ordered to assess the endometrial stipe, discussed possible EMB depending on the result.  Options for cycle control were again discussed-she would like to try NuvaRing-reviewed option for cyclic vs continuous use, will probably start this Sunday as she is expecting her period in a few days.  Plan to RTC after her ultrasound to review the results.    Bre Forbes MD, FACOG    Total visit time was 30 minutes with 20 minutes spent in counseling and coordination of care for menorrhagia, anxiety symptoms.

## 2019-01-31 NOTE — NURSING NOTE
Chief Complaint   Patient presents with     irregular periods     for two months     Health Maintenance Due   Topic Date Due     HIV SCREEN (SYSTEM ASSIGNED)  12/23/1997     PAP SCREENING Q3 YR (SYSTEM ASSIGNED)  12/23/2000     DTAP/TDAP/TD IMMUNIZATION (1 - Tdap) 12/23/2004     INFLUENZA VACCINE (1) 09/01/2018     Patient states getting her cycle every two weeks. Patient states the cycles are getting heavier. Patient states not able to wear tampons due to pain. Jasmyn Cuadra CMA on 1/31/2019 at 9:44 AM

## 2019-01-31 NOTE — LETTER
Date:February 13, 2019      Patient was self referred, no letter generated. Do not send.        [facility] Health Information

## 2019-01-31 NOTE — LETTER
1/31/2019       RE: Kaycee Martínez  [address]  [facility] [address]     Dear Colleague,    Thank you for referring your patient, Kaycee Martínez, to the [facility] at [facility]. Please see a copy of my visit note below.    S:  Pt is a 37 y/o  who presents today to follow up her history of heavy periods as well as anxiety symptoms.  In 5/18 she was seen for similar symptoms and at that time she was started on Zoloft and we discussed a Mirena IUD for cycle control.  She did not return for the Mirena.  Her Zoloft was ultimately increased to 100 mg with good control of her symptoms.  Today she notes continued heavy bleeding and that her anxiety symptoms are worse lately.  She has a normal hgb and TSH less than a year ago.      O:  /77 (BP Location: Left arm, Patient Position: Sitting, Cuff Size: Adult Large)   Pulse 74   Wt 82 kg (180 lb 11.2 oz)   LMP 01/26/2019 (Exact Date)   Breastfeeding? No   BMI 31.02 kg/m       gen-pleasant, NAD    A:  37 y/o  with ongoing heavy periods and anxiety symptoms.    P:  Offered trial of 150 mg Zoloft daily-she would like to try.  Discussed that she will need at least 2 weeks to notice a change in her symptoms.  Encouraged her to establish care with a therapist here or a clinic of her choice.  Pelvic ultrasound was ordered to assess the endometrial stipe, discussed possible EMB depending on the result.  Options for cycle control were again discussed-she would like to try NuvaRing-reviewed option for cyclic vs continuous use, will probably start this Sunday as she is expecting her period in a few days.  Plan to RTC after her ultrasound to review the results.    Bre Forbes MD, FACOG    Total visit time was 30 minutes with 20 minutes spent in counseling and coordination of care for menorrhagia, anxiety symptoms.      Again, thank you for allowing me to participate in the care of your  patient.      Sincerely,    Bre Forbes MD

## 2019-01-31 NOTE — TELEPHONE ENCOUNTER
Pt returned to clinic to request nuva ring be sent to pharmacy as was discussed with Dr. Forbes. Confirmed with Dr. Forbes to send 3 with 3 refills

## 2019-05-02 ENCOUNTER — OFFICE VISIT (OUTPATIENT)
Dept: OBGYN | Facility: CLINIC | Age: 39
End: 2019-05-02
Attending: OBSTETRICS & GYNECOLOGY
Payer: COMMERCIAL

## 2019-05-02 VITALS
DIASTOLIC BLOOD PRESSURE: 75 MMHG | WEIGHT: 188.5 LBS | BODY MASS INDEX: 32.18 KG/M2 | SYSTOLIC BLOOD PRESSURE: 119 MMHG | HEIGHT: 64 IN | HEART RATE: 72 BPM

## 2019-05-02 DIAGNOSIS — R14.0 ABDOMINAL BLOATING: ICD-10-CM

## 2019-05-02 DIAGNOSIS — Z13.1 SCREENING FOR DIABETES MELLITUS: ICD-10-CM

## 2019-05-02 DIAGNOSIS — Z13.29 SCREENING FOR THYROID DISORDER: ICD-10-CM

## 2019-05-02 DIAGNOSIS — Z13.220 SCREENING FOR LIPOID DISORDERS: ICD-10-CM

## 2019-05-02 DIAGNOSIS — R30.0 DYSURIA: ICD-10-CM

## 2019-05-02 DIAGNOSIS — Z13.21 ENCOUNTER FOR VITAMIN DEFICIENCY SCREENING: ICD-10-CM

## 2019-05-02 DIAGNOSIS — N64.4 PAIN OF RIGHT BREAST: Primary | ICD-10-CM

## 2019-05-02 DIAGNOSIS — Z00.00 VISIT FOR PREVENTIVE HEALTH EXAMINATION: Primary | ICD-10-CM

## 2019-05-02 ASSESSMENT — MIFFLIN-ST. JEOR: SCORE: 1515.03

## 2019-05-02 NOTE — LETTER
2019       RE: Kaycee Martínez  [address]  [place] [facility] [place] [address]     Dear Colleague,    Thank you for referring your patient, Kaycee Martínez, to the [facility] at [facility]. Please see a copy of my visit note below.        Progress Note    SUBJECTIVE:  Kaycee Martínez is an 38 year old, , who requests an Annual Preventive Exam.  Was on Nuvaring x 4 months for cycle control, but didn't like the side effects so stopped it 2 weeks ago.  Her main concern today is a bloating sensation, could be food related but not sure.       Concerns today include: bloating    Menstrual History:  Menstrual History 2018   LAST MENSTRUAL PERIOD 2018   Menarche Age - - -   Period Cycle (Days) - - -   Period Duration (Days) - - -   Method of Contraception - - -   Period Pattern - - -   Menstrual Flow - - -   Menstrual Control - - -   Dysmenorrhea - - -   PMS Symptoms - - -   Reviewed Today - - -       Last  No results found for: PAP  History of abnormal Pap smear: NO - age 30-65 PAP every 5 years with negative HPV co-testing recommended    Last No results found for: HPV16  Last No results found for: HPV18  Last No results found for: HRHPV    Mammogram current: today  Last Mammogram:   No results found.     Last Colonoscopy:  No results found for this or any previous visit.      HISTORY:    Current Outpatient Medications on File Prior to Visit:  sertraline (ZOLOFT) 50 MG tablet Take 1 tablet orally daily   cyclobenzaprine (FLEXERIL) 5 MG tablet Take 1 tablet (5 mg) by mouth 3 times daily as needed for muscle spasms (Patient not taking: Reported on 2019)   etonogestrel-ethinyl estradiol (NUVARING) 0.12-0.015 MG/24HR vaginal ring Place 1 each vaginally every 28 days (Patient not taking: Reported on 2019)   sertraline (ZOLOFT) 100 MG tablet Take 1.5 tablets (150 mg) by mouth daily (Patient not taking:  Reported on 2019)   sertraline (ZOLOFT) 100 MG tablet Take 1 tablet (100 mg) by mouth daily (Patient not taking: Reported on 2019)     No current facility-administered medications on file prior to visit.   Allergies   Allergen Reactions     Liquid Adhesive Itching and Rash     Adhesive pad gels used for EKG monitors has caused rash and itching which spreads if scratched.  Adhesive pad gels used for EKG monitors has caused rash and itching which spreads if scratched.       There is no immunization history on file for this patient.    OB History    Para Term  AB Living   2 2 2 0 0 2   SAB TAB Ectopic Multiple Live Births   0 0 0 0 2     Past Medical History:   Diagnosis Date     Anxiety      Depression      Varicose vein of leg     on right leg     Past Surgical History:   Procedure Laterality Date     KNEE SURGERY Bilateral     ACL repair     Family History   Problem Relation Age of Onset     Cerebrovascular Disease Father         Mini stroke     Breast Cancer Maternal Grandmother 60     Social History     Socioeconomic History     Marital status:      Spouse name: None     Number of children: None     Years of education: None     Highest education level: None   Occupational History     Occupation: Merchandising supervisor   Social Needs     Financial resource strain: None     Food insecurity:     Worry: None     Inability: None     Transportation needs:     Medical: None     Non-medical: None   Tobacco Use     Smoking status: Never Smoker     Smokeless tobacco: Never Used   Substance and Sexual Activity     Alcohol use: Yes     Comment: 1 drink twice a month     Drug use: No     Sexual activity: Yes     Partners: Male     Birth control/protection: None   Lifestyle     Physical activity:     Days per week: None     Minutes per session: None     Stress: None   Relationships     Social connections:     Talks on phone: None     Gets together: None     Attends [employer] service: None      "Active member of club or organization: None     Attends meetings of clubs or organizations: None     Relationship status: None     Intimate partner violence:     Fear of current or ex partner: None     Emotionally abused: None     Physically abused: None     Forced sexual activity: None   Other Topics Concern     None   Social History Narrative     None       ROS  [unfilled]  PHQ-9 SCORE 4/23/2018 7/24/2018   PHQ-9 Total Score 16 7     YARITZA-7 SCORE 4/23/2018 7/24/2018   Total Score 20 10         EXAM:  Blood pressure 119/75, pulse 72, height 1.626 m (5' 4\"), weight 85.5 kg (188 lb 8 oz), last menstrual period 04/18/2019, not currently breastfeeding. Body mass index is 32.36 kg/m .  General - pleasant female in no acute distress.  Skin - no suspicious lesions or rashes  EENT-  PERRLA, euthyroid with out palpable nodules  Neck - supple without lymphadenopathy.  Lungs - clear to auscultation bilaterally.  Heart - regular rate and rhythm without murmur.  Abdomen - soft, nontender, nondistended, no masses or organomegaly noted.  Musculoskeletal - no gross deformities.  Neurological - normal strength, sensation, and mental status.    Breast Exam:  Breast: Without visible skin changes. No dimpling or lesions seen.   Breasts supple, non-tender with palpation, no dominant mass, nodularity, or nipple discharge noted bilaterally. Axillary nodes negative.      Pelvic Exam:  EG/BUS: Normal genital architecture without lesions, erythema or abnormal secretions Bartholin's, Urethra, Pratt's normal   Urethral meatus: normal   Urethra: no masses, tenderness, or scarring   Bladder: no masses or tenderness  Vagina: moist, pink, rugae with creamy, white and odorless  secretions  Cervix: Nulliparous, and no lesions  Uterus: anteverted,  and small, smooth, firm, mobile w/o pain  Adnexa: Within normal limits and No masses, nodularity, tenderness  Rectum:anus normal       ASSESSMENT:  Encounter Diagnoses   Name Primary?     Visit for preventive " health examination Yes     Screening for diabetes mellitus      Screening for lipoid disorders      Encounter for vitamin deficiency screening      Screening for thyroid disorder      Abdominal bloating      Dysuria         PLAN:   Orders Placed This Encounter   Procedures     US Pelvic Complete with Transvaginal     Lipid panel reflex to direct LDL Fasting     TSH with free T4 reflex     Vitamin D Deficiency     Discussed establishing care with a primary care provider at [facility] if her ultrasound is normal and her symptoms persist.      Additional teaching done at this visit regarding history of menorrhagia, cycle control.    Return to clinic in one year.  Follow-up as needed.    Bre Forbes MD, FACOG        Again, thank you for allowing me to participate in the care of your patient.      Sincerely,    Bre Forbes MD

## 2019-05-02 NOTE — LETTER
Date:May 8, 2019      Patient was self referred, no letter generated. Do not send.        [facility] Health Information

## 2019-05-02 NOTE — PATIENT INSTRUCTIONS

## 2019-05-23 ENCOUNTER — ANCILLARY PROCEDURE (OUTPATIENT)
Dept: ULTRASOUND IMAGING | Facility: CLINIC | Age: 39
End: 2019-05-23
Attending: OBSTETRICS & GYNECOLOGY
Payer: COMMERCIAL

## 2019-05-23 DIAGNOSIS — R14.0 ABDOMINAL BLOATING: ICD-10-CM

## 2019-05-23 PROCEDURE — 76830 TRANSVAGINAL US NON-OB: CPT

## 2019-06-03 ENCOUNTER — TELEPHONE (OUTPATIENT)
Dept: OBGYN | Facility: CLINIC | Age: 39
End: 2019-06-03

## 2019-06-03 NOTE — TELEPHONE ENCOUNTER
Spoke with Kaycee to let her know that her ultrasound results were normal. Patient upset with the way her visit was handled and that her results were not given to her. Urine culture was not run and she was not directed to the lab to have her labs done.

## 2019-06-03 NOTE — TELEPHONE ENCOUNTER
----- Message from Hoa Woods sent at 6/3/2019  8:31 AM CDT -----  Regarding: US results & Lab results  Contact: [phone]  Patient would like a call back to go over her US results and lab results.  She states she was in on 5/2/19 and saw Dr. Forbes, she left a urine sample in the clinic.  Her US was done on 5/23/19.  She said it was okay to leave a detailed message if it goes to voicemail.       Thank you!  Lupe    Call Center       Please DO NOT send this message and/or reply back to sender. Call Center Representatives DO NOT respond to messages.

## 2020-03-01 ENCOUNTER — HEALTH MAINTENANCE LETTER (OUTPATIENT)
Age: 40
End: 2020-03-01

## 2020-12-13 ENCOUNTER — HEALTH MAINTENANCE LETTER (OUTPATIENT)
Age: 40
End: 2020-12-13

## 2021-09-26 ENCOUNTER — HEALTH MAINTENANCE LETTER (OUTPATIENT)
Age: 41
End: 2021-09-26

## 2022-01-16 ENCOUNTER — HEALTH MAINTENANCE LETTER (OUTPATIENT)
Age: 42
End: 2022-01-16

## 2022-04-08 NOTE — LETTER
"7/24/2018       RE: Kaycee Martínez  [address]  [place] [address]     Dear Colleague,    Thank you for referring your patient, Kaycee Martínez, to the [facility] at [facility]. Please see a copy of my visit note below.    S:  Pt is a 36 y/o  who returns today to follow up her mood.  At her last visit she was started on Zoloft 50 mg for depression and anxiety symptoms.  She recently saw Ghada Spears as well, but declines continuing therapy at this time.  She states that her symptoms have improved quite a bit-especially the frequent checking of things like locks, but is still having some difficulty especially with sleep.    O: /70 (BP Location: Right arm, Patient Position: Chair)  Pulse 59  Ht 1.626 m (5' 4\")  Wt 79 kg (174 lb 3.2 oz)  BMI 29.9 kg/m2      PHQ9-7  YARITZA-7 = 10    A:  36 y/o  with mainly anxiety symptoms and some improvement on Zoloft 50 mg.    P:  Discussed increasing her dose to 100mg daily.  She was open to this.  New Rx sent.  Discussed that there are several medications available that can help with sleep.  Will start with a trial of Flexeril 5 mg at HS for 2-3 weeks until the new dose of Zoloft is effective.   Encouraged her to return in 2-4 weeks for a follow up mood check.     Bre Forbes MD, FACOG      Again, thank you for allowing me to participate in the care of your patient.      Sincerely,    Bre Forbes MD      " [FreeTextEntry1] : \par - Post procedure counseling given--patient to practice pelvic rest for 1 week, this includes nothing in the vagina, no submerging the vagina in water, no douching, no tampons\par \par \par I spent the time noted on the day of this patient encounter preparing for, providing and documenting the above E/M service and counseling and educate patient on differential, workup, disease course, and treatment/management. Education was provided to the patient during this encounter. All questions and concerns were answered and addressed in detail.\par \par Leora Beatty MD\par

## 2023-01-14 ENCOUNTER — HEALTH MAINTENANCE LETTER (OUTPATIENT)
Age: 43
End: 2023-01-14

## 2023-04-23 ENCOUNTER — HEALTH MAINTENANCE LETTER (OUTPATIENT)
Age: 43
End: 2023-04-23

## 2024-02-11 ENCOUNTER — HEALTH MAINTENANCE LETTER (OUTPATIENT)
Age: 44
End: 2024-02-11